# Patient Record
Sex: FEMALE | Race: WHITE | URBAN - NONMETROPOLITAN AREA
[De-identification: names, ages, dates, MRNs, and addresses within clinical notes are randomized per-mention and may not be internally consistent; named-entity substitution may affect disease eponyms.]

---

## 2021-10-05 LAB
ABO, EXTERNAL RESULT: NORMAL
C. TRACHOMATIS, EXTERNAL RESULT: NEGATIVE
HEP B, EXTERNAL RESULT: NONREACTIVE
HIV, EXTERNAL RESULT: NONREACTIVE
N. GONORRHOEAE, EXTERNAL RESULT: NEGATIVE
RH FACTOR, EXTERNAL RESULT: NEGATIVE
RPR, EXTERNAL RESULT: NONREACTIVE
RUBELLA TITER, EXTERNAL RESULT: NORMAL

## 2021-11-01 LAB
ABO, EXTERNAL RESULT: 0
C. TRACHOMATIS, EXTERNAL RESULT: NORMAL
HEP B, EXTERNAL RESULT: NORMAL
HIV, EXTERNAL RESULT: NORMAL
N. GONORRHOEAE, EXTERNAL RESULT: NORMAL
RH FACTOR, EXTERNAL RESULT: NORMAL
RPR, EXTERNAL RESULT: NORMAL
RUBELLA TITER, EXTERNAL RESULT: NORMAL

## 2022-05-11 LAB — GBS, EXTERNAL RESULT: NEGATIVE

## 2022-05-16 LAB — GBS, EXTERNAL RESULT: NORMAL

## 2022-05-29 ENCOUNTER — HOSPITAL ENCOUNTER (INPATIENT)
Age: 22
LOS: 2 days | Discharge: HOME | DRG: 560 | End: 2022-05-31
Attending: MIDWIFE | Admitting: ADVANCED PRACTICE MIDWIFE
Payer: COMMERCIAL

## 2022-05-29 PROBLEM — O47.9 UTERINE CONTRACTIONS: Status: ACTIVE | Noted: 2022-05-29

## 2022-05-29 LAB
-: NORMAL
ABSOLUTE EOS #: 0.45 K/UL (ref 0–0.44)
ABSOLUTE IMMATURE GRANULOCYTE: 0.05 K/UL (ref 0–0.3)
ABSOLUTE LYMPH #: 3.44 K/UL (ref 1.1–3.7)
ABSOLUTE MONO #: 1.19 K/UL (ref 0.1–1.2)
AMPHETAMINE SCREEN URINE: NEGATIVE
BARBITURATE SCREEN URINE: NEGATIVE
BASOPHILS # BLD: 1 % (ref 0–2)
BASOPHILS ABSOLUTE: 0.14 K/UL (ref 0–0.2)
BENZODIAZEPINE SCREEN, URINE: NEGATIVE
BILIRUBIN URINE: NEGATIVE
BUPRENORPHINE URINE: NEGATIVE
CANNABINOID SCREEN URINE: NEGATIVE
COCAINE METABOLITE, URINE: NEGATIVE
COLOR: YELLOW
EOSINOPHILS RELATIVE PERCENT: 4 % (ref 1–4)
EPITHELIAL CELLS UA: NORMAL /HPF (ref 0–25)
GLUCOSE URINE: NEGATIVE
HCT VFR BLD CALC: 38.2 % (ref 36.3–47.1)
HEMOGLOBIN: 12.5 G/DL (ref 11.9–15.1)
IMMATURE GRANULOCYTES: 1 %
KETONES, URINE: ABNORMAL
LEUKOCYTE ESTERASE, URINE: NEGATIVE
LYMPHOCYTES # BLD: 31 % (ref 24–43)
MCH RBC QN AUTO: 31.4 PG (ref 25.2–33.5)
MCHC RBC AUTO-ENTMCNC: 32.7 G/DL (ref 28.4–34.8)
MCV RBC AUTO: 96 FL (ref 82.6–102.9)
METHADONE SCREEN, URINE: NEGATIVE
METHAMPHETAMINE, URINE: NEGATIVE
MONOCYTES # BLD: 11 % (ref 3–12)
NITRITE, URINE: NEGATIVE
NRBC AUTOMATED: 0 PER 100 WBC
OPIATES, URINE: NEGATIVE
OXYCODONE SCREEN URINE: NEGATIVE
PDW BLD-RTO: 12.8 % (ref 11.8–14.4)
PH UA: 6 (ref 5–9)
PHENCYCLIDINE, URINE: NEGATIVE
PLATELET # BLD: 219 K/UL (ref 138–453)
PMV BLD AUTO: 10.7 FL (ref 8.1–13.5)
PROPOXYPHENE, URINE: NEGATIVE
PROTEIN UA: NEGATIVE
RBC # BLD: 3.98 M/UL (ref 3.95–5.11)
RBC UA: NORMAL /HPF (ref 0–2)
SEG NEUTROPHILS: 52 % (ref 36–65)
SEGMENTED NEUTROPHILS ABSOLUTE COUNT: 5.83 K/UL (ref 1.5–8.1)
SPECIFIC GRAVITY UA: 1.01 (ref 1.01–1.02)
TRICYCLIC ANTIDEPRESSANTS, UR: NEGATIVE
TURBIDITY: CLEAR
URINE HGB: ABNORMAL
UROBILINOGEN, URINE: NORMAL
WBC # BLD: 11.1 K/UL (ref 3.5–11.3)
WBC UA: NORMAL /HPF (ref 0–5)

## 2022-05-29 PROCEDURE — 80306 DRUG TEST PRSMV INSTRMNT: CPT

## 2022-05-29 PROCEDURE — 1220000000 HC SEMI PRIVATE OB R&B

## 2022-05-29 PROCEDURE — 85025 COMPLETE CBC W/AUTO DIFF WBC: CPT

## 2022-05-29 PROCEDURE — 2580000003 HC RX 258: Performed by: MIDWIFE

## 2022-05-29 PROCEDURE — 6360000002 HC RX W HCPCS

## 2022-05-29 PROCEDURE — 81001 URINALYSIS AUTO W/SCOPE: CPT

## 2022-05-29 PROCEDURE — 6360000002 HC RX W HCPCS: Performed by: MIDWIFE

## 2022-05-29 RX ORDER — NALBUPHINE HCL 10 MG/ML
10 AMPUL (ML) INJECTION
Status: DISCONTINUED | OUTPATIENT
Start: 2022-05-29 | End: 2022-05-30

## 2022-05-29 RX ORDER — SODIUM CHLORIDE 0.9 % (FLUSH) 0.9 %
5-40 SYRINGE (ML) INJECTION EVERY 12 HOURS SCHEDULED
Status: DISCONTINUED | OUTPATIENT
Start: 2022-05-29 | End: 2022-05-30

## 2022-05-29 RX ORDER — SODIUM CHLORIDE, SODIUM LACTATE, POTASSIUM CHLORIDE, AND CALCIUM CHLORIDE .6; .31; .03; .02 G/100ML; G/100ML; G/100ML; G/100ML
1000 INJECTION, SOLUTION INTRAVENOUS PRN
Status: DISCONTINUED | OUTPATIENT
Start: 2022-05-29 | End: 2022-05-30

## 2022-05-29 RX ORDER — METHYLERGONOVINE MALEATE 0.2 MG/ML
200 INJECTION INTRAVENOUS PRN
Status: DISCONTINUED | OUTPATIENT
Start: 2022-05-29 | End: 2022-05-30

## 2022-05-29 RX ORDER — SODIUM CHLORIDE 0.9 % (FLUSH) 0.9 %
5-40 SYRINGE (ML) INJECTION PRN
Status: DISCONTINUED | OUTPATIENT
Start: 2022-05-29 | End: 2022-05-30

## 2022-05-29 RX ORDER — ONDANSETRON 2 MG/ML
4 INJECTION INTRAMUSCULAR; INTRAVENOUS EVERY 6 HOURS PRN
Status: DISCONTINUED | OUTPATIENT
Start: 2022-05-29 | End: 2022-05-30

## 2022-05-29 RX ORDER — SODIUM CHLORIDE, SODIUM LACTATE, POTASSIUM CHLORIDE, CALCIUM CHLORIDE 600; 310; 30; 20 MG/100ML; MG/100ML; MG/100ML; MG/100ML
INJECTION, SOLUTION INTRAVENOUS CONTINUOUS
Status: DISCONTINUED | OUTPATIENT
Start: 2022-05-29 | End: 2022-05-30

## 2022-05-29 RX ORDER — SEVOFLURANE 250 ML/250ML
1 LIQUID RESPIRATORY (INHALATION) CONTINUOUS PRN
Status: DISCONTINUED | OUTPATIENT
Start: 2022-05-29 | End: 2022-05-30

## 2022-05-29 RX ORDER — ACETAMINOPHEN 325 MG/1
650 TABLET ORAL EVERY 4 HOURS PRN
Status: DISCONTINUED | OUTPATIENT
Start: 2022-05-29 | End: 2022-05-30

## 2022-05-29 RX ORDER — SODIUM CHLORIDE 9 MG/ML
25 INJECTION, SOLUTION INTRAVENOUS PRN
Status: DISCONTINUED | OUTPATIENT
Start: 2022-05-29 | End: 2022-05-30

## 2022-05-29 RX ORDER — LIDOCAINE HYDROCHLORIDE 10 MG/ML
30 INJECTION, SOLUTION EPIDURAL; INFILTRATION; INTRACAUDAL; PERINEURAL PRN
Status: DISCONTINUED | OUTPATIENT
Start: 2022-05-29 | End: 2022-05-30

## 2022-05-29 RX ORDER — BUTALBITAL, ACETAMINOPHEN AND CAFFEINE 50; 325; 40 MG/1; MG/1; MG/1
1 TABLET ORAL EVERY 6 HOURS PRN
Status: ON HOLD | COMMUNITY
End: 2022-05-31 | Stop reason: HOSPADM

## 2022-05-29 RX ORDER — SODIUM CHLORIDE, SODIUM LACTATE, POTASSIUM CHLORIDE, AND CALCIUM CHLORIDE .6; .31; .03; .02 G/100ML; G/100ML; G/100ML; G/100ML
500 INJECTION, SOLUTION INTRAVENOUS PRN
Status: DISCONTINUED | OUTPATIENT
Start: 2022-05-29 | End: 2022-05-30

## 2022-05-29 RX ORDER — MISOPROSTOL 100 UG/1
900 TABLET ORAL PRN
Status: DISCONTINUED | OUTPATIENT
Start: 2022-05-29 | End: 2022-05-30

## 2022-05-29 RX ORDER — METRONIDAZOLE 500 MG/1
500 TABLET ORAL 2 TIMES DAILY
Status: ON HOLD | COMMUNITY
End: 2022-05-31 | Stop reason: HOSPADM

## 2022-05-29 RX ORDER — CARBOPROST TROMETHAMINE 250 UG/ML
250 INJECTION, SOLUTION INTRAMUSCULAR PRN
Status: DISCONTINUED | OUTPATIENT
Start: 2022-05-29 | End: 2022-05-30

## 2022-05-29 RX ADMIN — SODIUM CHLORIDE, POTASSIUM CHLORIDE, SODIUM LACTATE AND CALCIUM CHLORIDE: 600; 310; 30; 20 INJECTION, SOLUTION INTRAVENOUS at 12:59

## 2022-05-29 RX ADMIN — Medication 1 MILLI-UNITS/MIN: at 13:00

## 2022-05-29 RX ADMIN — METHYLERGONOVINE MALEATE 200 MCG: 0.2 INJECTION INTRAVENOUS at 23:32

## 2022-05-29 RX ADMIN — Medication 999 ML/HR: at 23:26

## 2022-05-29 RX ADMIN — BUTORPHANOL TARTRATE 1 MG: 2 INJECTION, SOLUTION INTRAMUSCULAR; INTRAVENOUS at 21:48

## 2022-05-29 ASSESSMENT — PAIN DESCRIPTION - DESCRIPTORS
DESCRIPTORS: CRAMPING

## 2022-05-29 ASSESSMENT — PAIN - FUNCTIONAL ASSESSMENT: PAIN_FUNCTIONAL_ASSESSMENT: ACTIVITIES ARE NOT PREVENTED

## 2022-05-29 ASSESSMENT — PAIN DESCRIPTION - LOCATION: LOCATION: ABDOMEN

## 2022-05-29 ASSESSMENT — PAIN DESCRIPTION - ORIENTATION: ORIENTATION: LOWER

## 2022-05-29 ASSESSMENT — PAIN SCALES - GENERAL: PAINLEVEL_OUTOF10: 10

## 2022-05-29 NOTE — FLOWSHEET NOTE
QUINCY reddy [facility] notified that pitocin is at 20 mu orders obtained

## 2022-05-29 NOTE — PROGRESS NOTES
Patient arrives to unit via wheelchair accompanied by SO, stating that she started having contractions at 2130 last night (5/28/2022) less than 5 minutes apart, rating them 5/10 in pain. Also noted some leaking of fluid after she took a shower, but is unsure if it was pee or not. Patient oriented to room and call light system. Pt. Changed into gown, urine specimen collected. Amnio test preformed- negative for ROM, and SVE preformed (2-3/80/0).

## 2022-05-29 NOTE — PROGRESS NOTES
To room. Patient sitting on ball, to bed. SVE 4/70/-1 posterior/soft   AROM with sterile amnihook with return of scant amount of clear, odorless fluid. Fetal heart tones stable before, during and after ROM/  Patient tolerated well. Dr Augusto Huerta notified of progress and plan of care.

## 2022-05-29 NOTE — PROGRESS NOTES
Yvonne Leon CNM updated regarding patient. Orders received to admit patient, and place routine orders. Patient is okay to have an epidural whenever, if she chooses. Stated to call back when patient is either 6 cm or bag of water ruptures. Patient updated.

## 2022-05-29 NOTE — H&P
Department of Obstetrics and Gynecology   Obstetrics History and Physical  H&P Admission Inpatient  Note        CHIEF COMPLAINT:  contractions    HISTORY OF PRESENT ILLNESS:      The patient is a 19 y.o. female at Unknown. OB History          3    Para   2    Term   2            AB        Living   2         SAB        IAB        Ectopic        Molar        Multiple        Live Births                Patient presents with a chief complaint as above and is being admitted for early latent labor    Estimated Due Date: Estimated Date of Delivery: None noted. PRENATAL CARE:    Complicated by: none    PAST OB HISTORY:  OB History          3    Para   2    Term   2            AB        Living   2         SAB        IAB        Ectopic        Molar        Multiple        Live Births                    Past Medical History:    History reviewed. No pertinent past medical history. Past Surgical History:    History reviewed. No pertinent surgical history. Allergies:  Patient has no known allergies. Social History:    Social History     Socioeconomic History    Marital status: Single     Spouse name: Not on file    Number of children: Not on file    Years of education: Not on file    Highest education level: Not on file   Occupational History    Not on file   Tobacco Use    Smoking status: Former Smoker    Smokeless tobacco: Never Used   Vaping Use    Vaping Use: Never used   Substance and Sexual Activity    Alcohol use: No    Drug use: No    Sexual activity: Not on file   Other Topics Concern    Not on file   Social History Narrative    Not on file     Social Determinants of Health     Financial Resource Strain:     Difficulty of Paying Living Expenses: Not on file   Food Insecurity:     Worried About [address] in the Last Year: Not on file    Marylu of Food in the Last Year: Not on file   Transportation Needs:     Lack of Transportation (Medical):  Not on file    Lack of Transportation (Non-Medical): Not on file   Physical Activity:     Days of Exercise per Week: Not on file    Minutes of Exercise per Session: Not on file   Stress:     Feeling of Stress : Not on file   Social Connections:     Frequency of Communication with Friends and Family: Not on file    Frequency of Social Gatherings with Friends and Family: Not on file    Attends Worship Services: Not on file    Active Member of Clubs or Organizations: Not on file    Attends Club or Organization Meetings: Not on file    Marital Status: Not on file   Intimate Partner Violence:     Fear of Current or Ex-Partner: Not on file    Emotionally Abused: Not on file    Physically Abused: Not on file    Sexually Abused: Not on file   Housing Stability:     Unable to Pay for Housing in the Last Year: Not on file    Number of [place] in the Last Year: Not on file    Unstable Housing in the Last Year: Not on file     Family History:   History reviewed. No pertinent family history. Medications Prior to Admission:  Medications Prior to Admission: Prenatal MV-Min-Fe Fum-FA-DHA (PRENATAL 1 PO), Take 1 tablet by mouth daily  butalbital-acetaminophen-caffeine (FIORICET, ESGIC) -40 MG per tablet, Take 1 tablet by mouth every 6 hours as needed for Headaches  metroNIDAZOLE (FLAGYL) 500 MG tablet, Take 500 mg by mouth 2 times daily    REVIEW OF SYSTEMS:    CONSTITUTIONAL:  negative  RESPIRATORY:  negative  CARDIOVASCULAR:  negative  GASTROINTESTINAL:  negative  ALLERGIC/IMMUNOLOGIC:  negative  NEUROLOGICAL:  negative  BEHAVIOR/PSYCH:  negative    PHYSICAL EXAM:  Vitals:    05/29/22 1101 05/29/22 1201 05/29/22 1202 05/29/22 1304   BP: 119/62 128/73  (!) 119/59   Pulse: 59 58  72   Resp: 16  16    Temp:   98 °F (36.7 °C)    TempSrc:   Oral      General appearance:  awake, alert, cooperative, no apparent distress, and appears stated age  Neurologic:  Awake, alert, oriented to name, place and time.     Lungs:  No increased work of breathing, good air exchange  Abdomen:  Soft, non tender, gravid, consistent with her gestational age, EFW by Lestephanie's manouever was 7-0 lbs    Fetal heart rate:  Reassuring. Pelvis:  Adequate pelvis  Cervix: 4 cm 70 soft -1  Contraction frequency:  irregular    Membranes:  Intact    Labs:  Blood Type: O negative -     Rubella:  No results found for: RUBG  T. Pallidium, IGG:  No results found for: TREPG  Sickle Cell Screen: No results found for: SICKLE  Hepatitis B Surface Antigen: No results found for: HEPBSAG  HIV:  No results found for: [id number]       Results for orders placed or performed during the hospital encounter of 05/29/22   GBS, External Result   Result Value Ref Range    GBS, External Result neg    C. Trachomatis, External Result   Result Value Ref Range    C. Trachomatis, External Result neg    N. Gonorrhoeae, External Result   Result Value Ref Range    N. Gonorrhoeae, External Result neg    GBS, External Result   Result Value Ref Range    GBS, External Result negative    C. Trachomatis, External Result   Result Value Ref Range    C. Trachomatis, External Result negative    N. Gonorrhoeae, External Result   Result Value Ref Range    N.  Gonorrhoeae, External Result negative    Urinalysis   Result Value Ref Range    Color, UA Yellow Yellow    Turbidity UA Clear Clear    Glucose, Ur NEGATIVE NEGATIVE    Bilirubin Urine NEGATIVE NEGATIVE    Ketones, Urine 1+ (A) NEGATIVE    Specific Gravity, UA 1.015 1.010 - 1.020    Urine Hgb TRACE (A) NEGATIVE    pH, UA 6.0 5.0 - 9.0    Protein, UA NEGATIVE NEGATIVE    Urobilinogen, Urine Normal Normal    Nitrite, Urine NEGATIVE NEGATIVE    Leukocyte Esterase, Urine NEGATIVE NEGATIVE   Microscopic Urinalysis   Result Value Ref Range    -          WBC, UA 0 TO 2 0 - 5 /HPF    RBC, UA 2 TO 5 0 - 2 /HPF    Epithelial Cells UA 2 TO 5 0 - 25 /HPF   HIV, External Result   Result Value Ref Range    HIV, External Result NR    RPR, External Lab   Result Value Ref Range    RPR, External Result NR    Hepatitis B, External Result   Result Value Ref Range    Hep B, External Result NR    Rubella Titer, External Result   Result Value Ref Range    Rubella Titer, External Result immune    DRUG SCREEN MULTI URINE   Result Value Ref Range    Amphetamine Screen, Ur NEGATIVE NEGATIVE    Barbiturate Screen, Ur NEGATIVE NEGATIVE    Benzodiazepine Screen, Urine NEGATIVE NEGATIVE    Cocaine Metabolite, Urine NEGATIVE NEGATIVE    Methadone Screen, Urine NEGATIVE NEGATIVE    Opiates, Urine NEGATIVE NEGATIVE    Phencyclidine, Urine NEGATIVE NEGATIVE    Propoxyphene, Urine NEGATIVE NEGATIVE    Cannabinoid Scrn, Ur NEGATIVE NEGATIVE    Oxycodone Screen, Ur NEGATIVE NEGATIVE    Methamphetamine, Urine NEGATIVE NEGATIVE    Tricyclic Antidepressants, Urine NEGATIVE NEGATIVE    Buprenorphine Urine NEGATIVE NEGATIVE   CBC auto differential   Result Value Ref Range    WBC 11.1 3.5 - 11.3 k/uL    RBC 3.98 3.95 - 5.11 m/uL    Hemoglobin 12.5 11.9 - 15.1 g/dL    Hematocrit 38.2 36.3 - 47.1 %    MCV 96.0 82.6 - 102.9 fL    MCH 31.4 25.2 - 33.5 pg    MCHC 32.7 28.4 - 34.8 g/dL    RDW 12.8 11.8 - 14.4 %    Platelets 261 258 - 676 k/uL    MPV 10.7 8.1 - 13.5 fL    NRBC Automated 0.0 0.0 per 100 WBC    Seg Neutrophils 52 36 - 65 %    Lymphocytes 31 24 - 43 %    Monocytes 11 3 - 12 %    Eosinophils % 4 1 - 4 %    Basophils 1 0 - 2 %    Immature Granulocytes 1 (H) 0 %    Segs Absolute 5.83 1.50 - 8.10 k/uL    Absolute Lymph # 3.44 1.10 - 3.70 k/uL    Absolute Mono # 1.19 0.10 - 1.20 k/uL    Absolute Eos # 0.45 (H) 0.00 - 0.44 k/uL    Basophils Absolute 0.14 0.00 - 0.20 k/uL    Absolute Immature Granulocyte 0.05 0.00 - 0.30 k/uL   HIV, External Result   Result Value Ref Range    HIV, External Result nonreactive    RPR, External Lab   Result Value Ref Range    RPR, External Result nonreactive    Hepatitis B, External Result   Result Value Ref Range    Hep B, External Result nonreactive    Rubella Titer, External Result   Result Value Ref Range    Rubella Titer, External Result immune    Rh Factor, External Result   Result Value Ref Range    Rh Factor, External Result neg    ABO, External Result   Result Value Ref Range    ABO, External Result 0    Rh Factor, External Result   Result Value Ref Range    Rh Factor, External Result negative    ABO, External Result   Result Value Ref Range    ABO, External Result O negative        ASSESSMENT AND PLAN:    Estimated length of stay: less than two midnights    Principal Problem:    Uterine contractions  Plan: augmentation of labor       Labor: Admit, anticipate normal delivery, routine labor orders  Fetus: Reassuring, Cat 1  GBS: No  Other: IV hydration and antiemetics, pitocin, AROM  Dr Amie Richardson notified of patients admission, history and plan of care       Benja Garibay, ROCIO - MARIANNE,CNM,5/29/2022 1:13 PM Nic Tobar

## 2022-05-29 NOTE — PLAN OF CARE
Problem: Vaginal Birth or  Section  Goal: Fetal and maternal status remain reassuring during the birth process  Description:  Birth OB-Pregnancy care plan goal which identifies if the fetal and maternal status remain reassuring during the birth process  2022 by Karson Glez RN  Outcome: Progressing  2022 0749 by Helen Charles RN  Outcome: Progressing  2022 0524 by Usha Smith RN  Outcome: Progressing     Problem: Postpartum  Goal: Experiences normal postpartum course  Description:  Postpartum OB-Pregnancy care plan goal which identifies if the mother is experiencing a normal postpartum course  2022 by Karson Glez RN  Outcome: Progressing  2022 0749 by Helen Charles RN  Outcome: Progressing  2022 05 by Usha Smith RN  Outcome: Progressing  Goal: Appropriate maternal -  bonding  Description:  Postpartum OB-Pregnancy care plan goal which identifies if the mother and  are bonding appropriately  2022 by Karson Glez RN  Outcome: Progressing  [id number] by Helen Charles RN  Outcome: Progressing  2022 [phone] by Usha Smith RN  Outcome: Progressing  Goal: Establishment of infant feeding pattern  Description:  Postpartum OB-Pregnancy care plan goal which identifies if the mother is establishing a feeding pattern with their   2022 by Karson Glez RN  Outcome: Progressing  [id number] by Helen Charles RN  Outcome: Progressing  2022 0524 by Usha Smith RN  Outcome: Progressing  Goal: Incisions, wounds, or drain sites healing without S/S of infection  2022 by Karson Glez RN  Outcome: Progressing  2022 0749 by Helen Charles RN  Outcome: Progressing  2022 05 by Usha Smith RN  Outcome: Progressing     Problem: Pain  Goal: Verbalizes/displays adequate comfort level or baseline comfort level  2022 by Karson Glez RN  Outcome: Progressing  5/29/2022 0749 by Shazia Mims RN  Outcome: Progressing  5/29/2022 0524 by Farhan Quiles RN  Outcome: Progressing     Problem: Infection - Adult  Goal: Absence of infection at discharge  5/29/2022 1918 by Kain Madsen, RN  Outcome: Progressing  5/29/2022 0749 by Shazia Mims RN  Outcome: Progressing  5/29/2022 0524 by Farhan Quiles RN  Outcome: Progressing  Goal: Absence of infection during hospitalization  5/29/2022 1918 by Kain Madsen, RN  Outcome: Progressing  5/29/2022 0749 by Shazia Mims RN  Outcome: Progressing  5/29/2022 0524 by Farhan Quiles RN  Outcome: Progressing  Goal: Absence of fever/infection during anticipated neutropenic period  5/29/2022 1918 by Kain Madsen, RN  Outcome: Progressing  5/29/2022 0749 by Shazia Mims RN  Outcome: Progressing  5/29/2022 0524 by Farhan Quiles RN  Outcome: Progressing     Problem: Safety - Adult  Goal: Free from fall injury  5/29/2022 1918 by Kain Madsen, RN  Outcome: Progressing  5/29/2022 0749 by Shazia Mims RN  Outcome: Progressing  5/29/2022 0524 by Farhan Quiles RN  Outcome: Progressing     Problem: Discharge Planning  Goal: Discharge to home or other facility with appropriate resources  5/29/2022 1918 by Kain Madsen, RN  Outcome: Progressing  5/29/2022 0749 by Shazia Mims RN  Outcome: Progressing  5/29/2022 0524 by Farhan Quiles RN  Outcome: Progressing     Problem: Chronic Conditions and Co-morbidities  Goal: Patient's chronic conditions and co-morbidity symptoms are monitored and maintained or improved  5/29/2022 1918 by Kain Madsen RN  Outcome: Progressing  5/29/2022 0749 by Shazia Mims RN  Outcome: Progressing  5/29/2022 0524 by Farhan Quiles RN  Outcome: Progressing

## 2022-05-29 NOTE — PROGRESS NOTES
Caprice Esteban CNM updated with SVE, contraction pattern, and that pt stating she is getting more uncomfortable with the contractions, but does not want an epidural. No new orders received. Will continue to monitor.

## 2022-05-29 NOTE — PROGRESS NOTES
Bridgett Estevez CNM called and updated regarding patient. Stated to recheck patient in 2 hours, and if she has made change, to call DESTINY Do CNM to admit patient to manage labor. If no change, will call LYNN Muñiz back for plan of care.

## 2022-05-29 NOTE — PROGRESS NOTES
Jess Carreon CNM asked if patient could be on intermittent monitoring for now. Jess Carreon CNM okay with this. Kenyetta Choudhury RN updated.

## 2022-05-29 NOTE — PLAN OF CARE
Problem: Vaginal Birth or  Section  Goal: Fetal and maternal status remain reassuring during the birth process  Description:  Birth OB-Pregnancy care plan goal which identifies if the fetal and maternal status remain reassuring during the birth process  2022 by Ar Navas RN  Outcome: Progressing  2022 by Jose Armando Trevino RN  Outcome: Progressing     Problem: Postpartum  Goal: Experiences normal postpartum course  Description:  Postpartum OB-Pregnancy care plan goal which identifies if the mother is experiencing a normal postpartum course  2022 by Ar Navas RN  Outcome: Progressing  2022 by Jose Armando Trevino RN  Outcome: Progressing  Goal: Appropriate maternal -  bonding  Description:  Postpartum OB-Pregnancy care plan goal which identifies if the mother and  are bonding appropriately  2022 by Ar Navas RN  Outcome: Progressing  [phone] by Jose Armando Trevino RN  Outcome: Progressing  Goal: Establishment of infant feeding pattern  Description:  Postpartum OB-Pregnancy care plan goal which identifies if the mother is establishing a feeding pattern with their   2022 by Ar Navas RN  Outcome: Progressing  2022 by Jose Armando Trevino RN  Outcome: Progressing  Goal: Incisions, wounds, or drain sites healing without S/S of infection  2022 by Ar Navas RN  Outcome: Progressing  2022 by Jose Armando Trevino RN  Outcome: Progressing     Problem: Pain  Goal: Verbalizes/displays adequate comfort level or baseline comfort level  2022 by Ar Navas RN  Outcome: Progressing  2022 by Jose Armando Trevino RN  Outcome: Progressing     Problem: Infection - Adult  Goal: Absence of infection at discharge  2022 by Ar Navas RN  Outcome: Progressing  2022 by Jose Armando Trevino RN  Outcome: Progressing  Goal: Absence of infection during hospitalization  5/29/2022 0749 by Claudette Sinclair, RN  Outcome: Progressing  5/29/2022 0524 by Omega Fowler RN  Outcome: Progressing  Goal: Absence of fever/infection during anticipated neutropenic period  5/29/2022 0749 by Claudette Sinclair, RN  Outcome: Progressing  5/29/2022 0524 by Omega Fowler RN  Outcome: Progressing     Problem: Safety - Adult  Goal: Free from fall injury  5/29/2022 0749 by Claudette Sinclair, RN  Outcome: Progressing  5/29/2022 0524 by Omega Fowler RN  Outcome: Progressing     Problem: Discharge Planning  Goal: Discharge to home or other facility with appropriate resources  5/29/2022 0749 by Claudette Sinclair, RN  Outcome: Progressing  5/29/2022 0524 by Omega Fowler RN  Outcome: Progressing     Problem: Chronic Conditions and Co-morbidities  Goal: Patient's chronic conditions and co-morbidity symptoms are monitored and maintained or improved  5/29/2022 0749 by Claudette Sinclair, RN  Outcome: Progressing  5/29/2022 0524 by Omega Fowler RN  Outcome: Progressing

## 2022-05-30 PROCEDURE — 6370000000 HC RX 637 (ALT 250 FOR IP): Performed by: MIDWIFE

## 2022-05-30 PROCEDURE — 7200000001 HC VAGINAL DELIVERY

## 2022-05-30 PROCEDURE — 99024 POSTOP FOLLOW-UP VISIT: CPT | Performed by: ADVANCED PRACTICE MIDWIFE

## 2022-05-30 PROCEDURE — 10907ZC DRAINAGE OF AMNIOTIC FLUID, THERAPEUTIC FROM PRODUCTS OF CONCEPTION, VIA NATURAL OR ARTIFICIAL OPENING: ICD-10-PCS | Performed by: NURSE PRACTITIONER

## 2022-05-30 PROCEDURE — 1220000000 HC SEMI PRIVATE OB R&B

## 2022-05-30 RX ORDER — METHYLERGONOVINE MALEATE 0.2 MG/ML
200 INJECTION INTRAVENOUS PRN
Status: DISCONTINUED | OUTPATIENT
Start: 2022-05-30 | End: 2022-05-31 | Stop reason: HOSPADM

## 2022-05-30 RX ORDER — SODIUM CHLORIDE 0.9 % (FLUSH) 0.9 %
5-40 SYRINGE (ML) INJECTION EVERY 12 HOURS SCHEDULED
Status: DISCONTINUED | OUTPATIENT
Start: 2022-05-30 | End: 2022-05-31 | Stop reason: HOSPADM

## 2022-05-30 RX ORDER — SODIUM CHLORIDE 9 MG/ML
INJECTION, SOLUTION INTRAVENOUS PRN
Status: DISCONTINUED | OUTPATIENT
Start: 2022-05-30 | End: 2022-05-31 | Stop reason: HOSPADM

## 2022-05-30 RX ORDER — IBUPROFEN 800 MG/1
800 TABLET ORAL EVERY 8 HOURS PRN
Status: DISCONTINUED | OUTPATIENT
Start: 2022-05-30 | End: 2022-05-31 | Stop reason: HOSPADM

## 2022-05-30 RX ORDER — CARBOPROST TROMETHAMINE 250 UG/ML
250 INJECTION, SOLUTION INTRAMUSCULAR PRN
Status: DISCONTINUED | OUTPATIENT
Start: 2022-05-30 | End: 2022-05-31 | Stop reason: HOSPADM

## 2022-05-30 RX ORDER — ONDANSETRON 4 MG/1
8 TABLET, ORALLY DISINTEGRATING ORAL EVERY 8 HOURS PRN
Status: DISCONTINUED | OUTPATIENT
Start: 2022-05-30 | End: 2022-05-31 | Stop reason: HOSPADM

## 2022-05-30 RX ORDER — ACETAMINOPHEN 500 MG
1000 TABLET ORAL EVERY 8 HOURS PRN
Status: DISCONTINUED | OUTPATIENT
Start: 2022-05-30 | End: 2022-05-30 | Stop reason: SDUPTHER

## 2022-05-30 RX ORDER — MODIFIED LANOLIN
OINTMENT (GRAM) TOPICAL PRN
Status: DISCONTINUED | OUTPATIENT
Start: 2022-05-30 | End: 2022-05-31 | Stop reason: HOSPADM

## 2022-05-30 RX ORDER — SODIUM CHLORIDE 0.9 % (FLUSH) 0.9 %
5-40 SYRINGE (ML) INJECTION PRN
Status: DISCONTINUED | OUTPATIENT
Start: 2022-05-30 | End: 2022-05-31 | Stop reason: HOSPADM

## 2022-05-30 RX ORDER — DOCUSATE SODIUM 100 MG/1
100 CAPSULE, LIQUID FILLED ORAL 2 TIMES DAILY PRN
Status: DISCONTINUED | OUTPATIENT
Start: 2022-05-30 | End: 2022-05-31 | Stop reason: HOSPADM

## 2022-05-30 RX ORDER — ACETAMINOPHEN 500 MG
1000 TABLET ORAL EVERY 8 HOURS PRN
Status: DISCONTINUED | OUTPATIENT
Start: 2022-05-30 | End: 2022-05-31 | Stop reason: HOSPADM

## 2022-05-30 RX ORDER — MISOPROSTOL 100 UG/1
800 TABLET ORAL PRN
Status: DISCONTINUED | OUTPATIENT
Start: 2022-05-30 | End: 2022-05-31 | Stop reason: HOSPADM

## 2022-05-30 RX ADMIN — IBUPROFEN 800 MG: 800 TABLET, FILM COATED ORAL at 17:20

## 2022-05-30 RX ADMIN — BENZOCAINE AND LEVOMENTHOL: 200; 5 SPRAY TOPICAL at 01:30

## 2022-05-30 RX ADMIN — ACETAMINOPHEN 1000 MG: 500 TABLET, FILM COATED ORAL at 15:49

## 2022-05-30 RX ADMIN — Medication 40 EACH: at 01:30

## 2022-05-30 RX ADMIN — ACETAMINOPHEN 1000 MG: 500 TABLET, FILM COATED ORAL at 05:08

## 2022-05-30 RX ADMIN — IBUPROFEN 800 MG: 800 TABLET, FILM COATED ORAL at 08:58

## 2022-05-30 RX ADMIN — IBUPROFEN 800 MG: 800 TABLET, FILM COATED ORAL at 00:25

## 2022-05-30 ASSESSMENT — PAIN - FUNCTIONAL ASSESSMENT
PAIN_FUNCTIONAL_ASSESSMENT: ACTIVITIES ARE NOT PREVENTED
PAIN_FUNCTIONAL_ASSESSMENT: ACTIVITIES ARE NOT PREVENTED

## 2022-05-30 ASSESSMENT — PAIN DESCRIPTION - DESCRIPTORS
DESCRIPTORS: CRAMPING
DESCRIPTORS: CRAMPING

## 2022-05-30 ASSESSMENT — PAIN SCALES - GENERAL
PAINLEVEL_OUTOF10: 6
PAINLEVEL_OUTOF10: 4
PAINLEVEL_OUTOF10: 8

## 2022-05-30 ASSESSMENT — PAIN DESCRIPTION - LOCATION
LOCATION: ABDOMEN
LOCATION: ABDOMEN

## 2022-05-30 NOTE — FLOWSHEET NOTE
Mara LOPES updated on recent SVE. States pt may get up and walk around in the room and off the monitor until Pitocin is turned back on at 2100.

## 2022-05-30 NOTE — PROGRESS NOTES
Department of Obstetrics and Gynecology  Labor and Delivery       Post Partum Progress Note             SUBJECTIVE:  1st day postpartum s/p , denies c/o has hx of anxiety but \"doing well now\"    OBJECTIVE:      Vitals:  /73   Pulse 59   Temp 97.6 °F (36.4 °C) (Oral)   Resp 18   LMP  (LMP Unknown)   SpO2 95%   Breastfeeding Unknown   Patient Vitals for the past 24 hrs:   BP Temp Temp src Pulse Resp SpO2   22 0854 111/73 97.6 °F (36.4 °C) Oral 59 18 --   22 0505 105/63 97.9 °F (36.6 °C) Oral 61 16 --   22 0141 102/60 97.6 °F (36.4 °C) Oral 53 16 --   22 0128 114/71 -- -- 60 16 --   22 0118 112/64 -- -- 55 18 --   22 0100 112/64 -- -- -- 16 95 %   22 0058 115/60 -- -- (!) 48 16 --   22 0043 (!) 108/58 -- -- 58 18 --   22 0028 (!) 101/58 -- -- 55 18 --   22 0013 (!) 116/55 -- -- 58 16 --   22 0000 -- 97.8 °F (36.6 °C) Oral -- -- --   22 2358 (!) 107/57 -- -- 52 18 --   22 2345 (!) 101/56 -- -- 58 16 --   22 2223 (!) 110/57 -- -- 72 16 --   22 2200 -- 97.7 °F (36.5 °C) Oral -- 16 --   22 (!) 114/59 -- -- 57 16 --   22 (!) 104/58 -- -- 57 -- --   22 123/66 -- -- 62 18 --   22 (!) 104/52 -- -- 57 -- --   22 114/64 -- -- 63 -- --   22 -- -- -- 51 -- --   22 -- 97.4 °F (36.3 °C) Oral -- 18 --   22 193 (!) 110/58 -- -- 66 16 --   22 1905 100/64 -- -- 81 -- --   22 1900 -- -- -- -- 16 --   22 1835 (!) 110/56 -- -- 52 -- --   22 1805 118/65 -- -- 57 -- --   22 1800 -- 97.4 °F (36.3 °C) Oral -- 16 --   22 1735 (!) 111/58 -- -- 50 -- --   22 1705 121/64 -- -- 54 -- --   22 1700 -- -- -- -- 16 --   22 1634 117/68 -- -- 50 -- --   22 1604 126/60 -- -- 51 -- --   22 1600 -- 97.6 °F (36.4 °C) Oral -- 16 --   22 1535 125/64 -- -- 54 -- --   22 1504 119/78 -- -- 69 -- -- 05/29/22 1500 -- -- -- -- 16 --   05/29/22 1435 102/60 -- -- 59 -- --   05/29/22 1404 (!) 108/58 97.8 °F (36.6 °C) -- 67 -- --   05/29/22 1400 -- -- -- -- 16 --   05/29/22 1335 (!) 108/56 -- -- 58 -- --   05/29/22 1304 (!) 119/59 -- -- 72 -- --   05/29/22 1301 -- -- -- -- 16 --   05/29/22 1202 -- 98 °F (36.7 °C) Oral -- 16 --   05/29/22 1201 128/73 -- -- 58 -- --   05/29/22 1101 119/62 -- -- 59 16 --   05/29/22 1002 119/67 -- -- 63 16 --         ABDOMEN: normal shape, position and consistency  GENITAL/URINARY:  External Genitalia:  General appearance; normal, Hair distribution; normal, Lesions absent  Uterus:  Size normal, Tenderness absent  Breast:normal appearance, no masses or tenderness  Cor: RRR no Murmurs  Pulmonary: clear to auscultation anterior and posterior  Extremities: no Clubbing cyanosis or ecchymosis    DATA:    CBC:   Lab Results   Component Value Date    WBC 11.1 05/29/2022    RBC 3.98 05/29/2022    HGB 12.5 05/29/2022    HCT 38.2 05/29/2022    MCV 96.0 05/29/2022    MCH 31.4 05/29/2022    MCHC 32.7 05/29/2022    RDW 12.8 05/29/2022     05/29/2022    MPV 10.7 05/29/2022         ASSESSMENT :      Principal Problem:    Uterine contractions  Plan: delivered  Active Problems:    Normal delivery          Plan:  Routine orders and instructions plan d/c tomorrow

## 2022-05-30 NOTE — FLOWSHEET NOTE
Intermittent maternal heart tones tracing due to maternal position. RN at bedside. Ultrasound readjusted.

## 2022-05-30 NOTE — L&D DELIVERY NOTE
Mother's Information    Labor Events     Labor?: No  Cervical Ripening:   Now         Mirta Flank Girl Hannah [[id number]]    Labor Events     Labor?: No   Steroids?: None  Cervical Ripening Date/Time:     Antibiotics Received during Labor?: No  Rupture Date/Time: 22 12:52:00   Rupture Type: AROM, Intact  Fluid Color: Bloody Show  Fluid Odor: None  Induction: AROM, Oxytocin  Augmentation: AROM, Oxytocin  Labor Complications: None     Anesthesia    Method: None     Start Pushing    Labor onset date/time: 22 21:30:00 EDT Now   Dilation complete date/time: 22 23:08:00 Now   Start pushing date/time: 2022 23:08:00   Decision date/time (emergent ):       Delivery ()    Delivery Date/Time:  22 23:20:00   Delivery Type: Vaginal, Spontaneous    Details:         Presentation    Position: Left  _: Occiput  _: Anterior     Shoulder Dystocia    Shoulder Dystocia Present?: No  Add Second Maneuver  Add Third Maneuver  Add Fourth Maneuver  Add Fifth Maneuver  Add Sixth Maneuver  Add Seventh Maneuver  Add Eighth Maneuver  Add Ninth Maneuver     Assisted Delivery Details    Forceps Attempted?: No  Vacuum Extractor Attempted?: No     Document Additional Attempt       Document Additional Attempt             Cord    Vessels: 3 Vessels  Complications: None  Delayed Cord Clamping?: Yes  Cord Clamped Date/Time: 2022 23:21:00  Cord Blood Disposition: Lab  Gases Sent?: No     Placenta    Date/Time: 2022 23:26:00  Removal: Spontaneous  Appearance: Intact  Disposition: Discarded     Lacerations       Vaginal Counts      Sponges Needles Instruments   Initial Counts      Final Counts Correct  Correct   Final Count Personnel: RADHA VAUGHN  Final Count Verified By: Cindi Buck RN  Accurate Final Count?: Yes  If the count is incorrect due to Intentionally Retained Foreign Object (IRFO) add the IRFO LDA in Lines/Drains.   Add LDA: Link to LDA     Blood Loss Mother: Christina Diaz #[id number]   Start of Mother's Information    Delivery Blood Loss  22 2130 - 22 0025    None           End of Mother's Information  Mother: Christina Diaz #[id number]          Delivery Providers    Delivering clinician: ROCIO Hameed CNM     Provider Role     Obstetrician    Gustavo Saul RN Primary Nurse     Primary Blossom Nurse     NICU Nurse     Neonatologist     Anesthesiologist     Nurse Anesthetist     Nurse Practitioner     Midwife     Nurse Nurse    [facility]          Blossom Assessment    Living Status: Living     Apgar Scoring Key:    0 1 2    Skin Color: Blue or pale Acrocyanotic Completely pink    Heart Rate: Absent <100 bpm >100 bpm    Reflex Irritability: No response Grimace Cry or active withdrawal    Muscle Tone: Limp Some flexion Active motion    Respiratory Effort: Absent Weak cry; hypoventilation Good, crying                  Skin Color:   Heart Rate:   Reflex Irritability:   Muscle Tone:   Respiratory Effort: Total:            1 Minute:    1    2    2    2    2    9        Apgar 1 total from OB History    5 Minute:    1    2    2    2    2    9        Apgar 5 total from OB History    10 Minute:              15 Minute:              20 Minute:                      Apgars Assigned By: RHONA VAUGHN          Resuscitation    Method: Stimulation            Measurements           Title    Skin to Skin Initiation Date/Time: 22 23:21:00 EDT   Skin to Skin With: Mother   Skin to Skin End Date/Time:     Breastfeeding Initiated Date/Time: 2022 23:50:00              Department of Obstetrics and Gynecology  Spontaneous Vaginal Delivery Note          Pre-operative Diagnosis:  Principal Problem:    Uterine contractions  Resolved Problems:    * No resolved hospital problems.  *      Post-operative Diagnosis:  Living  infant(s) and Female    Blood Type and Rh: O- negative       Condition:  infant stable and mother stable remain bonding in delivery room      Details of Procedure: The patient is a 19 y.o. female at 36w3d   OB History        3    Para   2    Term   2            AB        Living   2       SAB        IAB        Ectopic        Molar        Multiple        Live Births                 who was admitted for early latent labor. She received the following interventions: ARBOW and IV Pitocin augmentation She was known to be GBS negative and did not receive antibiotic prophylaxis. The patient progressed well,did not receive an epidural, became complete and started to push. After pushing for 12 minutes  the fetal head was at the perineum,  the rest of the infant delivered atraumatically, placed on mother abdomen. Nucal Cord was not noted. Cord was clamped and cut per Jazmin Duarte, father of the baby  . The delivery of the placenta was spontaneous. Placenta was inspectedintact. The perineum and vagina were explored and a found to be intact.   Small abrasion of the right labia, unrepaired, no bleeding noted

## 2022-05-30 NOTE — FLOWSHEET NOTE
Mara LOPES at pt's bedside with bedside ultrasound machine. Confirmed that baby is head head position.

## 2022-05-31 VITALS
OXYGEN SATURATION: 95 % | TEMPERATURE: 97.8 F | DIASTOLIC BLOOD PRESSURE: 65 MMHG | HEART RATE: 52 BPM | RESPIRATION RATE: 18 BRPM | SYSTOLIC BLOOD PRESSURE: 119 MMHG

## 2022-05-31 PROBLEM — O47.9 UTERINE CONTRACTIONS: Status: RESOLVED | Noted: 2022-05-29 | Resolved: 2022-05-31

## 2022-05-31 PROCEDURE — 99024 POSTOP FOLLOW-UP VISIT: CPT | Performed by: ADVANCED PRACTICE MIDWIFE

## 2022-05-31 PROCEDURE — 6370000000 HC RX 637 (ALT 250 FOR IP): Performed by: MIDWIFE

## 2022-05-31 RX ORDER — MODIFIED LANOLIN
1 OINTMENT (GRAM) TOPICAL PRN
Qty: 1 EACH | Refills: 3 | Status: SHIPPED | OUTPATIENT
Start: 2022-05-31

## 2022-05-31 RX ORDER — IBUPROFEN 800 MG/1
800 TABLET ORAL EVERY 8 HOURS PRN
Qty: 60 TABLET | Refills: 0 | Status: SHIPPED | OUTPATIENT
Start: 2022-05-31

## 2022-05-31 RX ADMIN — IBUPROFEN 800 MG: 800 TABLET, FILM COATED ORAL at 04:17

## 2022-05-31 ASSESSMENT — PAIN SCALES - GENERAL: PAINLEVEL_OUTOF10: 6

## 2022-05-31 ASSESSMENT — PAIN DESCRIPTION - LOCATION: LOCATION: ABDOMEN

## 2022-05-31 ASSESSMENT — PAIN DESCRIPTION - ORIENTATION: ORIENTATION: LOWER

## 2022-05-31 ASSESSMENT — PAIN DESCRIPTION - DESCRIPTORS: DESCRIPTORS: CRAMPING

## 2022-05-31 ASSESSMENT — PAIN - FUNCTIONAL ASSESSMENT: PAIN_FUNCTIONAL_ASSESSMENT: ACTIVITIES ARE NOT PREVENTED

## 2022-05-31 NOTE — PROGRESS NOTES
Department of Obstetrics and Gynecology  Labor and Delivery       Post Partum Progress Note             SUBJECTIVE:  2nd day postpartum requests discharge, denies c/o    OBJECTIVE:      Vitals:  /65   Pulse 52   Temp 97.8 °F (36.6 °C) (Oral)   Resp 18   LMP  (LMP Unknown)   SpO2 95%   Breastfeeding Unknown   Patient Vitals for the past 24 hrs:   BP Temp Temp src Pulse Resp   05/31/22 0729 119/65 97.8 °F (36.6 °C) Oral 52 18   05/31/22 0325 (!) 117/59 97.8 °F (36.6 °C) Oral 54 16   05/30/22 2055 (!) 100/56 98 °F (36.7 °C) Oral 59 16   05/30/22 1248 113/62 97.5 °F (36.4 °C) Oral 63 16         ABDOMEN: normal shape, position and consistency  GENITAL/URINARY:  External Genitalia:  General appearance; normal, Hair distribution; normal, Lesions absent  Uterus:  Size normal  Breast:normal appearance, no masses or tenderness  Cor: RRR no Murmurs  Pulmonary: clear to auscultation anterior and posterior  Extremities: no Clubbing cyanosis or ecchymosis    DATA:    CBC:   Lab Results   Component Value Date    WBC 11.1 05/29/2022    RBC 3.98 05/29/2022    HGB 12.5 05/29/2022    HCT 38.2 05/29/2022    MCV 96.0 05/29/2022    MCH 31.4 05/29/2022    MCHC 32.7 05/29/2022    RDW 12.8 05/29/2022     05/29/2022    MPV 10.7 05/29/2022         ASSESSMENT :      Principal Problem:    Uterine contractions  Plan: delivered  Active Problems:    Normal delivery          Plan:  D/c home rto 2 and 6 weeks postpartum, routine instructions

## 2022-05-31 NOTE — LACTATION NOTE
Lactation education:    [x] Latch/ good latch vs shallow latch/ steps to obtaining deep latch    [x] How to know if infant is eating enough/ feedings per 24 hours, wet/dirty diapers    [x] Feeding/satiety cues      Lactation education resources given:     [x]  How to Breastfeed your baby - [address] publication      [x]  Follow up support information    [x]  Breast milk storage guidelines - [facility]    [x]  Breastpump cleaning guidelines - [facility]     [x]  Breastfeeding & Safe Sleep handout - [address] publication    [x]  Calling All Dads! Handout - [address] publication      []  Breast and Nipple Care - [facility]     []  [address]    []  [place]    []  Going Back to Work - [facility]    []  Preventing Engorgement - [facility]    Supplies given:    []  Brush, soap and basin for breastpump cleaning    []  Insurance pump provided     []  [facility] pump set up for patient to use    Explained to patient, patient verbalizes understanding.         Signed:  Neha Christian RN, BSN, IBCLC

## 2022-05-31 NOTE — DISCHARGE SUMMARY
Obstetrical Discharge Form        Gestational Age:39w0d    Antepartum complications: none    Date of Delivery: 22    Type of Delivery:        Delivered By:  Dmirti Hummel. Ernestina CHAN CNM    Assisted By:N/A}      Baby: female    Anesthesia:  none      Intrapartum complications: None    Feeding method: breast    Blood type: O neg baby Oneg    Rubella:  No results found for: Duard Setter, IGG:  No results found for: TREPG  Hepatitis B Surface Antigen: No results found for: HEPBSAG  HIV:  No results found for: [id number]    Results for orders placed or performed during the hospital encounter of 22   GBS, External Result   Result Value Ref Range    GBS, External Result neg    C. Trachomatis, External Result   Result Value Ref Range    C. Trachomatis, External Result neg    N. Gonorrhoeae, External Result   Result Value Ref Range    N. Gonorrhoeae, External Result neg    GBS, External Result   Result Value Ref Range    GBS, External Result negative    C. Trachomatis, External Result   Result Value Ref Range    C. Trachomatis, External Result negative    N. Gonorrhoeae, External Result   Result Value Ref Range    N.  Gonorrhoeae, External Result negative    Urinalysis   Result Value Ref Range    Color, UA Yellow Yellow    Turbidity UA Clear Clear    Glucose, Ur NEGATIVE NEGATIVE    Bilirubin Urine NEGATIVE NEGATIVE    Ketones, Urine 1+ (A) NEGATIVE    Specific Gravity, UA 1.015 1.010 - 1.020    Urine Hgb TRACE (A) NEGATIVE    pH, UA 6.0 5.0 - 9.0    Protein, UA NEGATIVE NEGATIVE    Urobilinogen, Urine Normal Normal    Nitrite, Urine NEGATIVE NEGATIVE    Leukocyte Esterase, Urine NEGATIVE NEGATIVE   Microscopic Urinalysis   Result Value Ref Range    -          WBC, UA 0 TO 2 0 - 5 /HPF    RBC, UA 2 TO 5 0 - 2 /HPF    Epithelial Cells UA 2 TO 5 0 - 25 /HPF   HIV, External Result   Result Value Ref Range    HIV, External Result NR    RPR, External Lab   Result Value Ref Range    RPR, External Result NR    Hepatitis B, External Result   Result Value Ref Range    Hep B, External Result NR    Rubella Titer, External Result   Result Value Ref Range    Rubella Titer, External Result immune    DRUG SCREEN MULTI URINE   Result Value Ref Range    Amphetamine Screen, Ur NEGATIVE NEGATIVE    Barbiturate Screen, Ur NEGATIVE NEGATIVE    Benzodiazepine Screen, Urine NEGATIVE NEGATIVE    Cocaine Metabolite, Urine NEGATIVE NEGATIVE    Methadone Screen, Urine NEGATIVE NEGATIVE    Opiates, Urine NEGATIVE NEGATIVE    Phencyclidine, Urine NEGATIVE NEGATIVE    Propoxyphene, Urine NEGATIVE NEGATIVE    Cannabinoid Scrn, Ur NEGATIVE NEGATIVE    Oxycodone Screen, Ur NEGATIVE NEGATIVE    Methamphetamine, Urine NEGATIVE NEGATIVE    Tricyclic Antidepressants, Urine NEGATIVE NEGATIVE    Buprenorphine Urine NEGATIVE NEGATIVE   CBC auto differential   Result Value Ref Range    WBC 11.1 3.5 - 11.3 k/uL    RBC 3.98 3.95 - 5.11 m/uL    Hemoglobin 12.5 11.9 - 15.1 g/dL    Hematocrit 38.2 36.3 - 47.1 %    MCV 96.0 82.6 - 102.9 fL    MCH 31.4 25.2 - 33.5 pg    MCHC 32.7 28.4 - 34.8 g/dL    RDW 12.8 11.8 - 14.4 %    Platelets 689 697 - 869 k/uL    MPV 10.7 8.1 - 13.5 fL    NRBC Automated 0.0 0.0 per 100 WBC    Seg Neutrophils 52 36 - 65 %    Lymphocytes 31 24 - 43 %    Monocytes 11 3 - 12 %    Eosinophils % 4 1 - 4 %    Basophils 1 0 - 2 %    Immature Granulocytes 1 (H) 0 %    Segs Absolute 5.83 1.50 - 8.10 k/uL    Absolute Lymph # 3.44 1.10 - 3.70 k/uL    Absolute Mono # 1.19 0.10 - 1.20 k/uL    Absolute Eos # 0.45 (H) 0.00 - 0.44 k/uL    Basophils Absolute 0.14 0.00 - 0.20 k/uL    Absolute Immature Granulocyte 0.05 0.00 - 0.30 k/uL   HIV, External Result   Result Value Ref Range    HIV, External Result nonreactive    RPR, External Lab   Result Value Ref Range    RPR, External Result nonreactive    Hepatitis B, External Result   Result Value Ref Range    Hep B, External Result nonreactive    Rubella Titer, External Result   Result Value Ref Range    Rubella Titer, External Result immune    Rh Factor, External Result   Result Value Ref Range    Rh Factor, External Result neg    ABO, External Result   Result Value Ref Range    ABO, External Result 0    Rh Factor, External Result   Result Value Ref Range    Rh Factor, External Result negative    ABO, External Result   Result Value Ref Range    ABO, External Result O negative          Postpartum complications: none    Discharge Medication:      Medication List      ASK your doctor about these medications    butalbital-acetaminophen-caffeine -40 MG per tablet  Commonly known as: FIORICET, ESGIC     metroNIDAZOLE 500 MG tablet  Commonly known as: FLAGYL     PRENATAL 1 PO            Admit date: 5/29/2022  1:24 AM    Discharge Date: 5/31/2022    Discharged to: Home in stable condition        Plan:   Follow up 2 and 6 weeks postpartum